# Patient Record
Sex: FEMALE | Race: WHITE | NOT HISPANIC OR LATINO | Employment: FULL TIME | URBAN - NONMETROPOLITAN AREA
[De-identification: names, ages, dates, MRNs, and addresses within clinical notes are randomized per-mention and may not be internally consistent; named-entity substitution may affect disease eponyms.]

---

## 2025-06-29 ENCOUNTER — APPOINTMENT (OUTPATIENT)
Dept: RADIOLOGY | Facility: HOSPITAL | Age: 55
End: 2025-06-29
Payer: COMMERCIAL

## 2025-06-29 ENCOUNTER — APPOINTMENT (OUTPATIENT)
Dept: CARDIOLOGY | Facility: HOSPITAL | Age: 55
End: 2025-06-29
Payer: COMMERCIAL

## 2025-06-29 ENCOUNTER — HOSPITAL ENCOUNTER (EMERGENCY)
Facility: HOSPITAL | Age: 55
Discharge: HOME | End: 2025-06-30
Attending: EMERGENCY MEDICINE
Payer: COMMERCIAL

## 2025-06-29 DIAGNOSIS — M54.6 ACUTE THORACIC BACK PAIN, UNSPECIFIED BACK PAIN LATERALITY: Primary | ICD-10-CM

## 2025-06-29 DIAGNOSIS — R91.1 PULMONARY NODULE: ICD-10-CM

## 2025-06-29 LAB
BASOPHILS # BLD AUTO: 0.04 X10*3/UL (ref 0–0.1)
BASOPHILS NFR BLD AUTO: 0.7 %
D DIMER PPP FEU-MCNC: 653 NG/ML FEU
EOSINOPHIL # BLD AUTO: 0.27 X10*3/UL (ref 0–0.7)
EOSINOPHIL NFR BLD AUTO: 4.6 %
ERYTHROCYTE [DISTWIDTH] IN BLOOD BY AUTOMATED COUNT: 14 % (ref 11.5–14.5)
HCT VFR BLD AUTO: 41.1 % (ref 36–46)
HGB BLD-MCNC: 13.6 G/DL (ref 12–16)
IMM GRANULOCYTES # BLD AUTO: 0.02 X10*3/UL (ref 0–0.7)
IMM GRANULOCYTES NFR BLD AUTO: 0.3 % (ref 0–0.9)
LYMPHOCYTES # BLD AUTO: 2.13 X10*3/UL (ref 1.2–4.8)
LYMPHOCYTES NFR BLD AUTO: 36.5 %
MCH RBC QN AUTO: 26.9 PG (ref 26–34)
MCHC RBC AUTO-ENTMCNC: 33.1 G/DL (ref 32–36)
MCV RBC AUTO: 81 FL (ref 80–100)
MONOCYTES # BLD AUTO: 0.31 X10*3/UL (ref 0.1–1)
MONOCYTES NFR BLD AUTO: 5.3 %
NEUTROPHILS # BLD AUTO: 3.06 X10*3/UL (ref 1.2–7.7)
NEUTROPHILS NFR BLD AUTO: 52.6 %
NRBC BLD-RTO: 0 /100 WBCS (ref 0–0)
PLATELET # BLD AUTO: 217 X10*3/UL (ref 150–450)
RBC # BLD AUTO: 5.06 X10*6/UL (ref 4–5.2)
WBC # BLD AUTO: 5.8 X10*3/UL (ref 4.4–11.3)

## 2025-06-29 PROCEDURE — 99285 EMERGENCY DEPT VISIT HI MDM: CPT | Performed by: EMERGENCY MEDICINE

## 2025-06-29 PROCEDURE — 84484 ASSAY OF TROPONIN QUANT: CPT | Performed by: EMERGENCY MEDICINE

## 2025-06-29 PROCEDURE — 85025 COMPLETE CBC W/AUTO DIFF WBC: CPT | Performed by: EMERGENCY MEDICINE

## 2025-06-29 PROCEDURE — 93005 ELECTROCARDIOGRAM TRACING: CPT

## 2025-06-29 PROCEDURE — 36415 COLL VENOUS BLD VENIPUNCTURE: CPT | Performed by: EMERGENCY MEDICINE

## 2025-06-29 PROCEDURE — 71045 X-RAY EXAM CHEST 1 VIEW: CPT | Performed by: SURGERY

## 2025-06-29 PROCEDURE — 85379 FIBRIN DEGRADATION QUANT: CPT | Performed by: EMERGENCY MEDICINE

## 2025-06-29 PROCEDURE — 80048 BASIC METABOLIC PNL TOTAL CA: CPT | Performed by: EMERGENCY MEDICINE

## 2025-06-29 PROCEDURE — 71045 X-RAY EXAM CHEST 1 VIEW: CPT

## 2025-06-29 ASSESSMENT — PAIN - FUNCTIONAL ASSESSMENT: PAIN_FUNCTIONAL_ASSESSMENT: 0-10

## 2025-06-29 ASSESSMENT — PAIN SCALES - GENERAL: PAINLEVEL_OUTOF10: 8

## 2025-06-29 ASSESSMENT — PAIN DESCRIPTION - FREQUENCY: FREQUENCY: CONSTANT/CONTINUOUS

## 2025-06-29 ASSESSMENT — PAIN DESCRIPTION - LOCATION: LOCATION: BACK

## 2025-06-29 ASSESSMENT — PAIN DESCRIPTION - PAIN TYPE: TYPE: ACUTE PAIN

## 2025-06-30 ENCOUNTER — APPOINTMENT (OUTPATIENT)
Dept: RADIOLOGY | Facility: HOSPITAL | Age: 55
End: 2025-06-30
Payer: COMMERCIAL

## 2025-06-30 VITALS
SYSTOLIC BLOOD PRESSURE: 148 MMHG | DIASTOLIC BLOOD PRESSURE: 85 MMHG | RESPIRATION RATE: 16 BRPM | BODY MASS INDEX: 40.12 KG/M2 | OXYGEN SATURATION: 96 % | WEIGHT: 235 LBS | HEART RATE: 88 BPM | HEIGHT: 64 IN

## 2025-06-30 LAB
ANION GAP SERPL CALC-SCNC: 13 MMOL/L (ref 10–20)
BUN SERPL-MCNC: 10 MG/DL (ref 6–23)
CALCIUM SERPL-MCNC: 8.8 MG/DL (ref 8.6–10.3)
CARDIAC TROPONIN I PNL SERPL HS: 5 NG/L (ref 0–13)
CARDIAC TROPONIN I PNL SERPL HS: 5 NG/L (ref 0–13)
CHLORIDE SERPL-SCNC: 104 MMOL/L (ref 98–107)
CO2 SERPL-SCNC: 25 MMOL/L (ref 21–32)
CREAT SERPL-MCNC: 1.03 MG/DL (ref 0.5–1.05)
EGFRCR SERPLBLD CKD-EPI 2021: 65 ML/MIN/1.73M*2
GLUCOSE SERPL-MCNC: 142 MG/DL (ref 74–99)
POTASSIUM SERPL-SCNC: 3.6 MMOL/L (ref 3.5–5.3)
SODIUM SERPL-SCNC: 138 MMOL/L (ref 136–145)

## 2025-06-30 PROCEDURE — 36415 COLL VENOUS BLD VENIPUNCTURE: CPT | Performed by: EMERGENCY MEDICINE

## 2025-06-30 PROCEDURE — 71275 CT ANGIOGRAPHY CHEST: CPT

## 2025-06-30 PROCEDURE — 2550000001 HC RX 255 CONTRASTS: Performed by: EMERGENCY MEDICINE

## 2025-06-30 PROCEDURE — 71275 CT ANGIOGRAPHY CHEST: CPT | Performed by: RADIOLOGY

## 2025-06-30 PROCEDURE — 84484 ASSAY OF TROPONIN QUANT: CPT | Performed by: EMERGENCY MEDICINE

## 2025-06-30 RX ADMIN — IOHEXOL 68 ML: 350 INJECTION, SOLUTION INTRAVENOUS at 00:26

## 2025-06-30 ASSESSMENT — PAIN SCALES - GENERAL: PAINLEVEL_OUTOF10: 0 - NO PAIN

## 2025-06-30 NOTE — ED PROVIDER NOTES
54-year-old female presents with a chief complaint of sharp stabbing pain between her shoulder blades beginning about 1 hour ago.  She was not doing anything in particular other than getting ready for bed.  No change in activities throughout the day.  It is constant.  It is not reproducible with palpation and movement or deep inspiration.  Denies any radiating symptoms to the extremity neck or jaw.  No anterior chest pain.  No shortness of breath.  No fevers chills or night sweats.         Review of Systems     Physical Exam  Vitals and nursing note reviewed.   Constitutional:       Appearance: She is not ill-appearing or toxic-appearing.   HENT:      Head: Normocephalic and atraumatic.      Right Ear: Tympanic membrane normal.      Left Ear: Tympanic membrane normal.      Nose: Nose normal.      Mouth/Throat:      Mouth: Mucous membranes are moist.      Pharynx: No oropharyngeal exudate or posterior oropharyngeal erythema.   Eyes:      Extraocular Movements: Extraocular movements intact.      Conjunctiva/sclera: Conjunctivae normal.      Pupils: Pupils are equal, round, and reactive to light.   Cardiovascular:      Rate and Rhythm: Normal rate and regular rhythm.   Pulmonary:      Effort: Pulmonary effort is normal. No respiratory distress.      Breath sounds: Normal breath sounds. No wheezing, rhonchi or rales.   Abdominal:      General: There is no distension.      Palpations: Abdomen is soft. There is no mass.      Tenderness: There is no abdominal tenderness. There is no guarding.   Musculoskeletal:         General: No deformity. Normal range of motion.      Cervical back: Neck supple. No tenderness.   Skin:     General: Skin is warm and dry.   Neurological:      General: No focal deficit present.      Mental Status: She is alert and oriented to person, place, and time.   Psychiatric:         Mood and Affect: Mood normal.          Labs Reviewed   BASIC METABOLIC PANEL - Abnormal       Result Value    Glucose  142 (*)     Sodium 138      Potassium 3.6      Chloride 104      Bicarbonate 25      Anion Gap 13      Urea Nitrogen 10      Creatinine 1.03      eGFR 65      Calcium 8.8     D-DIMER, VTE EXCLUSION - Abnormal    D-Dimer, Quantitative VTE Exclusion 653 (*)     Narrative:     The VTE Exclusion D-Dimer assay is reported in ng/mL Fibrinogen Equivalent Units (FEU).    Per 's instructions for use, a value of less than 500 ng/mL (FEU) may help to exclude DVT or PE in outpatients when the assay is used with a clinical pretest probability assessment.([facility] must utilize and document eCalc 'Wells Score Deep Vein Thrombosis Risk' for DVT exclusion only. Emergency Department should utilize  Guidelines for Emergency Department Use of the VTE Exclusion D-Dimer and Clinical Pretest probability assessment model for DVT or PE exclusion.)   SERIAL TROPONIN-INITIAL - Normal    Troponin I, High Sensitivity 5      Narrative:     Less than 99th percentile of normal range cutoff-  Female and children under 18 years old <14 ng/L; Male <21 ng/L: Negative  Repeat testing should be performed if clinically indicated.     Female and children under 18 years old 14-50 ng/L; Male 21-50 ng/L:  Consistent with possible cardiac damage and possible increased clinical   risk. Serial measurements may help to assess extent of myocardial damage.     >50 ng/L: Consistent with cardiac damage, increased clinical risk and  myocardial infarction. Serial measurements may help assess extent of   myocardial damage.      NOTE: Children less than 1 year old may have higher baseline troponin   levels and results should be interpreted in conjunction with the overall   clinical context.     NOTE: Troponin I testing is performed using a different   testing methodology at [facility] than at other   [facility]. Direct result comparisons should only   be made within the same method.   SERIAL TROPONIN, 1 HOUR - Normal    Troponin I, High  Sensitivity 5      Narrative:     Less than 99th percentile of normal range cutoff-  Female and children under 18 years old <14 ng/L; Male <21 ng/L: Negative  Repeat testing should be performed if clinically indicated.     Female and children under 18 years old 14-50 ng/L; Male 21-50 ng/L:  Consistent with possible cardiac damage and possible increased clinical   risk. Serial measurements may help to assess extent of myocardial damage.     >50 ng/L: Consistent with cardiac damage, increased clinical risk and  myocardial infarction. Serial measurements may help assess extent of   myocardial damage.      NOTE: Children less than 1 year old may have higher baseline troponin   levels and results should be interpreted in conjunction with the overall   clinical context.     NOTE: Troponin I testing is performed using a different   testing methodology at [facility] than at other   [facility]. Direct result comparisons should only   be made within the same method.   CBC WITH AUTO DIFFERENTIAL    WBC 5.8      nRBC 0.0      RBC 5.06      Hemoglobin 13.6      Hematocrit 41.1      MCV 81      MCH 26.9      MCHC 33.1      RDW 14.0      Platelets 217      Neutrophils % 52.6      Immature Granulocytes %, Automated 0.3      Lymphocytes % 36.5      Monocytes % 5.3      Eosinophils % 4.6      Basophils % 0.7      Neutrophils Absolute 3.06      Immature Granulocytes Absolute, Automated 0.02      Lymphocytes Absolute 2.13      Monocytes Absolute 0.31      Eosinophils Absolute 0.27      Basophils Absolute 0.04     TROPONIN SERIES- (INITIAL, 1 HR)    Narrative:     The following orders were created for panel order Troponin I Series, High Sensitivity (0, 1 HR).  Procedure                               Abnormality         Status                     ---------                               -----------         ------                     Troponin I, High Sensiti...[[id number]]  Normal              Final result                Troponin, High Sensitivi...[[id number]]  Normal              Final result                 Please view results for these tests on the individual orders.        CT angio chest for pulmonary embolism   Final Result   No acute pulmonary embolus to the segmental level.        Wall thickening of the esophagus. Correlate for symptoms of   esophagitis        7 mm left upper lobe pulmonary nodule. Recommend CT chest at 6-12   months as per Fleischner criteria.             MACRO:   None.        Signed by: Evan Finkelstein 6/30/2025 12:44 AM   Dictation workstation:   [id number]      XR chest 1 view   Final Result   1.  No evidence of acute cardiopulmonary process.                  MACRO:   None        Signed by: Yimi Escoto 6/30/2025 12:17 AM   Dictation workstation:   [id number]           Procedures     Medical Decision Making  54-year-old female presents with a chief complaint of sharp stabbing pain between her shoulder blades beginning about 1 hour ago.  She was not doing anything in particular other than getting ready for bed.  No change in activities throughout the day.  It is constant.  It is not reproducible with palpation and movement or deep inspiration.  Denies any radiating symptoms to the extremity neck or jaw.  No anterior chest pain.  No shortness of breath.  No fevers chills or night sweats.  Upon arrival IV access was obtained patient was placed on a monitor which demonstrated a normal sinus rhythm.  EKG is negative for ischemic changes.  Initial and repeat troponins are negative.  D-dimer was slightly elevated.  CT angio was negative for pulmonary embolism.  She did gradually begin to feel better during her stay in the emergency department without receiving any medication.  CT scan did report some wall thickening of the esophagus and the patient does report a remote history of indigestion during pregnancy.  Has been asymptomatic in that regard since.  Recommended a 2-week course of Pepcid and Prilosec if  her symptoms returned.  Patient also has a 7 mm left upper lobe pulmonary nodule.  This information was passed on to the patient and the recommendation of a repeat CT scan in 6 to 12 months.  Patient can be discharged home.    DDx: STEMI, ACS, PE, pneumonia    Amount and/or Complexity of Data Reviewed  ECG/medicine tests: independent interpretation performed.     Details: Sinus rhythm, narrow complex, left axis deviation, no ST elevation or depression, no ectopy.         Diagnoses as of 06/30/25 0132   Acute thoracic back pain, unspecified back pain laterality   Pulmonary nodule                    Keenan Layne MD  06/30/25 0132

## 2025-06-30 NOTE — DISCHARGE INSTRUCTIONS
Please feel free to return to the emergency department with any additional concerns.  A pulmonary nodule that was 7 mm in diameter was seen in your left upper lung lobe.  Radiology recommends a repeat CT scan in 6 to 12 months.  If similar symptoms return may try a 2 to 4-week course of over-the-counter Pepcid and Prilosec.

## 2025-07-05 LAB
ATRIAL RATE: 95 BPM
P AXIS: 32 DEGREES
P OFFSET: 184 MS
P ONSET: 130 MS
PR INTERVAL: 164 MS
Q ONSET: 212 MS
QRS COUNT: 15 BEATS
QRS DURATION: 102 MS
QT INTERVAL: 382 MS
QTC CALCULATION(BAZETT): 480 MS
QTC FREDERICIA: 445 MS
R AXIS: -36 DEGREES
T AXIS: 24 DEGREES
T OFFSET: 403 MS
VENTRICULAR RATE: 95 BPM

## 2025-08-07 ENCOUNTER — APPOINTMENT (OUTPATIENT)
Age: 55
End: 2025-08-07
Payer: COMMERCIAL

## 2025-08-07 VITALS
HEIGHT: 64 IN | OXYGEN SATURATION: 98 % | BODY MASS INDEX: 41.38 KG/M2 | DIASTOLIC BLOOD PRESSURE: 112 MMHG | SYSTOLIC BLOOD PRESSURE: 170 MMHG | WEIGHT: 242.4 LBS | HEART RATE: 93 BPM

## 2025-08-07 DIAGNOSIS — I10 PRIMARY HYPERTENSION: ICD-10-CM

## 2025-08-07 DIAGNOSIS — Z12.11 ENCOUNTER FOR SCREENING FOR MALIGNANT NEOPLASM OF COLON: Primary | ICD-10-CM

## 2025-08-07 DIAGNOSIS — Z13.1 SCREENING FOR DIABETES MELLITUS: ICD-10-CM

## 2025-08-07 DIAGNOSIS — R91.1 PULMONARY NODULE: ICD-10-CM

## 2025-08-07 DIAGNOSIS — M72.2 BILATERAL PLANTAR FASCIITIS: ICD-10-CM

## 2025-08-07 DIAGNOSIS — Z13.220 SCREENING, LIPID: ICD-10-CM

## 2025-08-07 PROCEDURE — 3075F SYST BP GE 130 - 139MM HG: CPT | Performed by: NURSE PRACTITIONER

## 2025-08-07 PROCEDURE — 1036F TOBACCO NON-USER: CPT | Performed by: NURSE PRACTITIONER

## 2025-08-07 PROCEDURE — 99204 OFFICE O/P NEW MOD 45 MIN: CPT | Performed by: NURSE PRACTITIONER

## 2025-08-07 PROCEDURE — 3079F DIAST BP 80-89 MM HG: CPT | Performed by: NURSE PRACTITIONER

## 2025-08-07 PROCEDURE — 3008F BODY MASS INDEX DOCD: CPT | Performed by: NURSE PRACTITIONER

## 2025-08-07 RX ORDER — LISINOPRIL AND HYDROCHLOROTHIAZIDE 10; 12.5 MG/1; MG/1
1 TABLET ORAL DAILY
Qty: 30 TABLET | Refills: 3 | Status: SHIPPED | OUTPATIENT
Start: 2025-08-07 | End: 2025-09-06

## 2025-08-07 ASSESSMENT — ENCOUNTER SYMPTOMS
LIGHT-HEADEDNESS: 0
SHORTNESS OF BREATH: 1
CONSTIPATION: 0
COUGH: 0
FATIGUE: 1
NAUSEA: 0
DYSPHORIC MOOD: 0
PALPITATIONS: 0
NERVOUS/ANXIOUS: 0
DIZZINESS: 0
DIARRHEA: 0
SLEEP DISTURBANCE: 0
HEADACHES: 0
BLOOD IN STOOL: 0
VOMITING: 0
ABDOMINAL PAIN: 0
UNEXPECTED WEIGHT CHANGE: 1

## 2025-08-07 ASSESSMENT — PATIENT HEALTH QUESTIONNAIRE - PHQ9
SUM OF ALL RESPONSES TO PHQ9 QUESTIONS 1 AND 2: 0
2. FEELING DOWN, DEPRESSED OR HOPELESS: NOT AT ALL
1. LITTLE INTEREST OR PLEASURE IN DOING THINGS: NOT AT ALL

## 2025-08-07 NOTE — ASSESSMENT & PLAN NOTE
No FXHX Colon CA  Denies melena, change in bowel habits, abdominal pain or unintentional weight loss. I discussed options for screening for colon CA & Yany prefers Cologuard   Dispense Cologuard  Orders:    Cologuard® colon cancer screening; Future

## 2025-08-07 NOTE — ASSESSMENT & PLAN NOTE
Hx of bilateral foot pain @ arch of foot  Worse in AM or with walking after sitting/resting for some time  Discussed wearing supportive shoes w/ good arch support, plantar fasciitis stretching exercises, avoidance of walking barefooted, taking anti-inflammatories as needed (can tolerate NSAIDS in low doses without hives)

## 2025-08-07 NOTE — ASSESSMENT & PLAN NOTE
BMI 41.61, 242#  CK Aic  + FXHX DM  No increased urine freq, increase hunger or thirst  Orders:    Hemoglobin A1C; Future

## 2025-08-07 NOTE — ASSESSMENT & PLAN NOTE
Incidental 7 mm left upper lobe pulmonary nodule seen from ER visit in June.   Nonsmoker, no cough, hemoptysis  Recommend CT chest at 6-12months as per Fleischner criteria.  Orders:    CT chest wo IV contrast; Future

## 2025-08-07 NOTE — ASSESSMENT & PLAN NOTE
Has been noticing swelling in both of her lower extremities for several months. No home BP readings. Denies headaches/chest pains/dizziness or syncopal episodes.   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 No discoloration/ulcerations to feet. Weight up 7# since June. No BP readings @ home  Repeat BP readings in office: 170/112 w/ automatic BP cuff. Manual /110. Her past BP readings from ER are elevated.  Orders:    CBC and Auto Differential; Future    Comprehensive Metabolic Panel; Future    TSH with reflex to Free T4 if abnormal; Future    Follow Up In Primary Care - Established; Future    lisinopriL-hydrochlorothiazide 10-12.5 mg tablet; Take 1 tablet by mouth once daily.

## 2025-08-07 NOTE — PROGRESS NOTES
"Subjective   Patient ID: Yany Fraire is a 54 y.o. female who presents for New Patient Visit (Establish care , blood work up, edema and high BP ).    Yany comes to the office today, as a new patient & is here to establish care.  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   Respiratory:  Positive for shortness of breath. Negative for cough.    Cardiovascular:  Positive for leg swelling. Negative for chest pain and palpitations.   Gastrointestinal:  Negative for abdominal pain, blood in stool, constipation, diarrhea, nausea and vomiting.   Skin:         Bug bites to bilateral lower extremities   Neurological:  Negative for dizziness, light-headedness and headaches.   Psychiatric/Behavioral:  Negative for dysphoric mood and sleep disturbance. The patient is not nervous/anxious.        Objective   BP (!) 170/112 (BP Location: Left arm, Patient Position: Sitting, BP Cuff Size: Large adult)   Pulse 93   Ht 1.626 m (5' 4\")   Wt 110 kg (242 lb 6.4 oz)   SpO2 98%   BMI 41.61 kg/m²     Physical Exam  Vitals reviewed.   Constitutional:       General: She is not in acute distress.     Appearance: She is obese. She is not ill-appearing.   HENT:      Head: Normocephalic.   Neck:      Thyroid: No thyromegaly or thyroid tenderness.      Vascular: No carotid bruit.     Cardiovascular:      Rate and Rhythm: Normal rate and regular rhythm.      Heart sounds: Normal heart sounds. No murmur heard.  Pulmonary:      Effort: Pulmonary effort is normal.      Breath sounds: Normal breath sounds.   Abdominal:      General: Abdomen is protuberant. Bowel sounds are normal.    "   Palpations: Abdomen is soft.      Tenderness: There is no abdominal tenderness.     Musculoskeletal:      Right lower le+ Edema present.      Left lower le+ Edema present.   Lymphadenopathy:      Cervical: No cervical adenopathy.     Skin:     General: Skin is warm and dry.      Capillary Refill: Capillary refill takes less than 2 seconds.      Findings: Rash present. Rash is pustular.      Comments: Small circular red pustules to bilateral lower extremities     Neurological:      General: No focal deficit present.      Mental Status: She is alert and oriented to person, place, and time.     Psychiatric:         Attention and Perception: Attention normal.         Mood and Affect: Mood normal.         Speech: Speech normal.         Behavior: Behavior normal. Behavior is cooperative.         Assessment/Plan   Assessment & Plan  Encounter for screening for malignant neoplasm of colon  No FXHX Colon CA  Denies melena, change in bowel habits, abdominal pain or unintentional weight loss. I discussed options for screening for colon CA & Yany prefers Cologuard   Dispense Cologuard  Orders:    Cologuard® colon cancer screening; Future    Primary hypertension  Has been noticing swelling in both of her lower extremities for several months. No home BP readings. Denies headaches/chest pains/dizziness or syncopal episodes.   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 No discoloration/ulcerations to feet. Weight up 7# since . No BP readings @ home  Repeat BP readings in office: 170/112 w/ automatic BP cuff. Manual /110. Her past BP readings from ER are elevated.  Orders:    CBC and Auto Differential; Future    Comprehensive Metabolic Panel; Future    TSH with reflex to Free T4 if abnormal; Future    Follow Up In Primary Care - Established; Future    lisinopriL-hydrochlorothiazide 10-12.5 mg tablet; Take 1 tablet by mouth  once daily.    Screening for diabetes mellitus  BMI 41.61, 242#  CK Aic  + FXHX DM  No increased urine freq, increase hunger or thirst  Orders:    Hemoglobin A1C; Future    Screening, lipid  CK lipid Panel  Orders:    Lipid Panel; Future    Pulmonary nodule  Incidental 7 mm left upper lobe pulmonary nodule seen from ER visit in June.   Nonsmoker, no cough, hemoptysis  Recommend CT chest at 6-12months as per Fleischner criteria.  Orders:    CT chest wo IV contrast; Future    Bilateral plantar fasciitis  Hx of bilateral foot pain @ arch of foot  Worse in AM or with walking after sitting/resting for some time  Discussed wearing supportive shoes w/ good arch support, plantar fasciitis stretching exercises, avoidance of walking barefooted, taking anti-inflammatories as needed (can tolerate NSAIDS in low doses without hives)

## 2025-08-07 NOTE — PATIENT INSTRUCTIONS
Office visit in 1 month  Please complete your blood work prior to your next office visit and we will review at that upcoming appointment. If you have been asked to fast for your upcoming bloodwork please don't eat or drink 12 hours prior to your blood work. You may have water, black coffee or tea without creamer, milk or sugar.   Dispense Cologuard  Start Zestoretic daily, in the morning for your blood pressure  Check blood pressure 2-3 times per week & record. Bring blood pressure readings with you to your next office visit  Check CT of chest in 6 months to follow-up on left lung nodule  May apply over-the-counter hydrocortisone cream to apply to the flea bites to your lower legs  For any new medications that were prescribed today, the patient was educated about their indications for use, administration, frequency and potential side effects of the medication.

## 2025-08-16 ENCOUNTER — OFFICE VISIT (OUTPATIENT)
Dept: URGENT CARE | Facility: CLINIC | Age: 55
End: 2025-08-16
Payer: COMMERCIAL

## 2025-08-16 VITALS
TEMPERATURE: 97.2 F | BODY MASS INDEX: 40.63 KG/M2 | DIASTOLIC BLOOD PRESSURE: 90 MMHG | HEART RATE: 82 BPM | SYSTOLIC BLOOD PRESSURE: 157 MMHG | HEIGHT: 64 IN | OXYGEN SATURATION: 98 % | WEIGHT: 238 LBS

## 2025-08-16 DIAGNOSIS — R21 RASH AND OTHER NONSPECIFIC SKIN ERUPTION: Primary | ICD-10-CM

## 2025-08-16 PROCEDURE — 99213 OFFICE O/P EST LOW 20 MIN: CPT | Performed by: NURSE PRACTITIONER

## 2025-08-18 ENCOUNTER — TELEPHONE (OUTPATIENT)
Age: 55
End: 2025-08-18
Payer: COMMERCIAL

## 2025-08-21 ENCOUNTER — OFFICE VISIT (OUTPATIENT)
Age: 55
End: 2025-08-21
Payer: COMMERCIAL

## 2025-08-21 VITALS
BODY MASS INDEX: 41.15 KG/M2 | HEIGHT: 64 IN | OXYGEN SATURATION: 98 % | DIASTOLIC BLOOD PRESSURE: 82 MMHG | HEART RATE: 81 BPM | WEIGHT: 241 LBS | SYSTOLIC BLOOD PRESSURE: 142 MMHG

## 2025-08-21 DIAGNOSIS — I10 PRIMARY HYPERTENSION: Primary | ICD-10-CM

## 2025-08-21 PROCEDURE — 3077F SYST BP >= 140 MM HG: CPT | Performed by: NURSE PRACTITIONER

## 2025-08-21 PROCEDURE — 99213 OFFICE O/P EST LOW 20 MIN: CPT | Performed by: NURSE PRACTITIONER

## 2025-08-21 PROCEDURE — 3008F BODY MASS INDEX DOCD: CPT | Performed by: NURSE PRACTITIONER

## 2025-08-21 PROCEDURE — 1036F TOBACCO NON-USER: CPT | Performed by: NURSE PRACTITIONER

## 2025-08-21 PROCEDURE — 3079F DIAST BP 80-89 MM HG: CPT | Performed by: NURSE PRACTITIONER

## 2025-08-21 RX ORDER — AMLODIPINE BESYLATE 5 MG/1
5 TABLET ORAL DAILY
Qty: 30 TABLET | Refills: 11 | Status: SHIPPED | OUTPATIENT
Start: 2025-08-21 | End: 2026-08-21

## 2025-08-21 ASSESSMENT — ENCOUNTER SYMPTOMS
HEADACHES: 0
DIZZINESS: 0
PALPITATIONS: 0
LIGHT-HEADEDNESS: 0

## 2025-09-04 ENCOUNTER — APPOINTMENT (OUTPATIENT)
Age: 55
End: 2025-09-04
Payer: COMMERCIAL

## 2025-09-22 ENCOUNTER — APPOINTMENT (OUTPATIENT)
Age: 55
End: 2025-09-22
Payer: COMMERCIAL